# Patient Record
Sex: MALE | Race: WHITE | Employment: FULL TIME | URBAN - METROPOLITAN AREA
[De-identification: names, ages, dates, MRNs, and addresses within clinical notes are randomized per-mention and may not be internally consistent; named-entity substitution may affect disease eponyms.]

---

## 2023-01-21 ENCOUNTER — HOSPITAL ENCOUNTER (INPATIENT)
Age: 58
LOS: 2 days | Discharge: HOME | DRG: 149 | End: 2023-01-23
Attending: EMERGENCY MEDICINE | Admitting: INTERNAL MEDICINE
Payer: COMMERCIAL

## 2023-01-21 ENCOUNTER — APPOINTMENT (OUTPATIENT)
Dept: CT IMAGING | Age: 58
DRG: 149 | End: 2023-01-21
Payer: COMMERCIAL

## 2023-01-21 ENCOUNTER — APPOINTMENT (OUTPATIENT)
Dept: GENERAL RADIOLOGY | Age: 58
DRG: 149 | End: 2023-01-21
Payer: COMMERCIAL

## 2023-01-21 DIAGNOSIS — R11.2 NAUSEA AND VOMITING, UNSPECIFIED VOMITING TYPE: ICD-10-CM

## 2023-01-21 DIAGNOSIS — R42 ACUTE ONSET OF SEVERE VERTIGO: Primary | ICD-10-CM

## 2023-01-21 LAB
A/G RATIO: 1.3 (ref 1.1–2.2)
A/G RATIO: 1.3 (ref 1.1–2.2)
ALBUMIN SERPL-MCNC: 4.3 G/DL (ref 3.4–5)
ALBUMIN SERPL-MCNC: 4.5 G/DL (ref 3.4–5)
ALP BLD-CCNC: 103 U/L (ref 40–129)
ALP BLD-CCNC: 97 U/L (ref 40–129)
ALT SERPL-CCNC: 21 U/L (ref 10–40)
ALT SERPL-CCNC: 26 U/L (ref 10–40)
ANION GAP SERPL CALCULATED.3IONS-SCNC: 12 MMOL/L (ref 3–16)
ANION GAP SERPL CALCULATED.3IONS-SCNC: 15 MMOL/L (ref 3–16)
AST SERPL-CCNC: 16 U/L (ref 15–37)
AST SERPL-CCNC: 25 U/L (ref 15–37)
BACTERIA: NORMAL /HPF
BASOPHILS ABSOLUTE: 0.2 K/UL (ref 0–0.2)
BASOPHILS RELATIVE PERCENT: 1.5 %
BILIRUB SERPL-MCNC: 0.4 MG/DL (ref 0–1)
BILIRUB SERPL-MCNC: 0.5 MG/DL (ref 0–1)
BILIRUBIN URINE: NEGATIVE
BLOOD, URINE: NEGATIVE
BUN BLDV-MCNC: 22 MG/DL (ref 7–20)
BUN BLDV-MCNC: 22 MG/DL (ref 7–20)
CALCIUM SERPL-MCNC: 9 MG/DL (ref 8.3–10.6)
CALCIUM SERPL-MCNC: 9.6 MG/DL (ref 8.3–10.6)
CHLORIDE BLD-SCNC: 100 MMOL/L (ref 99–110)
CHLORIDE BLD-SCNC: 100 MMOL/L (ref 99–110)
CLARITY: CLEAR
CO2: 21 MMOL/L (ref 21–32)
CO2: 22 MMOL/L (ref 21–32)
COLOR: YELLOW
CREAT SERPL-MCNC: 1.4 MG/DL (ref 0.9–1.3)
CREAT SERPL-MCNC: 1.5 MG/DL (ref 0.9–1.3)
EOSINOPHILS ABSOLUTE: 0.7 K/UL (ref 0–0.6)
EOSINOPHILS RELATIVE PERCENT: 5.1 %
EPITHELIAL CELLS, UA: 0 /HPF (ref 0–5)
GFR SERPL CREATININE-BSD FRML MDRD: 54 ML/MIN/{1.73_M2}
GFR SERPL CREATININE-BSD FRML MDRD: 59 ML/MIN/{1.73_M2}
GLUCOSE BLD-MCNC: 163 MG/DL (ref 70–99)
GLUCOSE BLD-MCNC: 166 MG/DL (ref 70–99)
GLUCOSE URINE: NEGATIVE MG/DL
HCT VFR BLD CALC: 44 % (ref 40.5–52.5)
HEMOGLOBIN: 14.7 G/DL (ref 13.5–17.5)
HYALINE CASTS: 0 /LPF (ref 0–8)
INR BLD: 1.01 (ref 0.87–1.14)
KETONES, URINE: NEGATIVE MG/DL
LEUKOCYTE ESTERASE, URINE: NEGATIVE
LYMPHOCYTES ABSOLUTE: 4.3 K/UL (ref 1–5.1)
LYMPHOCYTES RELATIVE PERCENT: 33.6 %
MCH RBC QN AUTO: 29.3 PG (ref 26–34)
MCHC RBC AUTO-ENTMCNC: 33.5 G/DL (ref 31–36)
MCV RBC AUTO: 87.5 FL (ref 80–100)
MICROSCOPIC EXAMINATION: YES
MONOCYTES ABSOLUTE: 1.5 K/UL (ref 0–1.3)
MONOCYTES RELATIVE PERCENT: 11.9 %
NEUTROPHILS ABSOLUTE: 6.2 K/UL (ref 1.7–7.7)
NEUTROPHILS RELATIVE PERCENT: 47.9 %
NITRITE, URINE: NEGATIVE
PDW BLD-RTO: 13.9 % (ref 12.4–15.4)
PH UA: 7.5 (ref 5–8)
PLATELET # BLD: 373 K/UL (ref 135–450)
PMV BLD AUTO: 8.3 FL (ref 5–10.5)
POTASSIUM REFLEX MAGNESIUM: 3.7 MMOL/L (ref 3.5–5.1)
POTASSIUM REFLEX MAGNESIUM: 4.1 MMOL/L (ref 3.5–5.1)
PROTEIN UA: ABNORMAL MG/DL
PROTHROMBIN TIME: 13.2 SEC (ref 11.7–14.5)
RBC # BLD: 5.03 M/UL (ref 4.2–5.9)
RBC UA: 0 /HPF (ref 0–4)
SODIUM BLD-SCNC: 134 MMOL/L (ref 136–145)
SODIUM BLD-SCNC: 136 MMOL/L (ref 136–145)
SPECIFIC GRAVITY UA: 1.02 (ref 1–1.03)
TOTAL PROTEIN: 7.7 G/DL (ref 6.4–8.2)
TOTAL PROTEIN: 8.1 G/DL (ref 6.4–8.2)
TROPONIN: <0.01 NG/ML
URINE REFLEX TO CULTURE: ABNORMAL
URINE TYPE: ABNORMAL
UROBILINOGEN, URINE: 1 E.U./DL
WBC # BLD: 12.9 K/UL (ref 4–11)
WBC UA: 0 /HPF (ref 0–5)

## 2023-01-21 PROCEDURE — 71045 X-RAY EXAM CHEST 1 VIEW: CPT

## 2023-01-21 PROCEDURE — 70450 CT HEAD/BRAIN W/O DYE: CPT

## 2023-01-21 PROCEDURE — 6370000000 HC RX 637 (ALT 250 FOR IP): Performed by: INTERNAL MEDICINE

## 2023-01-21 PROCEDURE — 96374 THER/PROPH/DIAG INJ IV PUSH: CPT

## 2023-01-21 PROCEDURE — 2580000003 HC RX 258: Performed by: EMERGENCY MEDICINE

## 2023-01-21 PROCEDURE — 70498 CT ANGIOGRAPHY NECK: CPT

## 2023-01-21 PROCEDURE — 96361 HYDRATE IV INFUSION ADD-ON: CPT

## 2023-01-21 PROCEDURE — 84484 ASSAY OF TROPONIN QUANT: CPT

## 2023-01-21 PROCEDURE — 99285 EMERGENCY DEPT VISIT HI MDM: CPT

## 2023-01-21 PROCEDURE — 81001 URINALYSIS AUTO W/SCOPE: CPT

## 2023-01-21 PROCEDURE — 6360000004 HC RX CONTRAST MEDICATION: Performed by: EMERGENCY MEDICINE

## 2023-01-21 PROCEDURE — 85025 COMPLETE CBC W/AUTO DIFF WBC: CPT

## 2023-01-21 PROCEDURE — 1200000000 HC SEMI PRIVATE

## 2023-01-21 PROCEDURE — 85610 PROTHROMBIN TIME: CPT

## 2023-01-21 PROCEDURE — 2060000000 HC ICU INTERMEDIATE R&B

## 2023-01-21 PROCEDURE — 6360000002 HC RX W HCPCS: Performed by: EMERGENCY MEDICINE

## 2023-01-21 PROCEDURE — 80053 COMPREHEN METABOLIC PANEL: CPT

## 2023-01-21 PROCEDURE — 36415 COLL VENOUS BLD VENIPUNCTURE: CPT

## 2023-01-21 PROCEDURE — 6360000002 HC RX W HCPCS: Performed by: INTERNAL MEDICINE

## 2023-01-21 PROCEDURE — 94760 N-INVAS EAR/PLS OXIMETRY 1: CPT

## 2023-01-21 PROCEDURE — 93005 ELECTROCARDIOGRAM TRACING: CPT | Performed by: EMERGENCY MEDICINE

## 2023-01-21 PROCEDURE — 2580000003 HC RX 258: Performed by: INTERNAL MEDICINE

## 2023-01-21 PROCEDURE — 6370000000 HC RX 637 (ALT 250 FOR IP): Performed by: EMERGENCY MEDICINE

## 2023-01-21 RX ORDER — SODIUM CHLORIDE, SODIUM LACTATE, POTASSIUM CHLORIDE, CALCIUM CHLORIDE 600; 310; 30; 20 MG/100ML; MG/100ML; MG/100ML; MG/100ML
INJECTION, SOLUTION INTRAVENOUS CONTINUOUS
Status: DISCONTINUED | OUTPATIENT
Start: 2023-01-21 | End: 2023-01-23

## 2023-01-21 RX ORDER — DIAZEPAM 5 MG/1
5 TABLET ORAL ONCE
Status: COMPLETED | OUTPATIENT
Start: 2023-01-21 | End: 2023-01-21

## 2023-01-21 RX ORDER — ASPIRIN 300 MG/1
300 SUPPOSITORY RECTAL DAILY
Status: DISCONTINUED | OUTPATIENT
Start: 2023-01-21 | End: 2023-01-23 | Stop reason: HOSPADM

## 2023-01-21 RX ORDER — ALLOPURINOL 100 MG/1
100 TABLET ORAL DAILY
COMMUNITY

## 2023-01-21 RX ORDER — ASPIRIN 81 MG/1
81 TABLET ORAL DAILY
Status: DISCONTINUED | OUTPATIENT
Start: 2023-01-21 | End: 2023-01-23 | Stop reason: HOSPADM

## 2023-01-21 RX ORDER — MECLIZINE HCL 12.5 MG/1
25 TABLET ORAL 3 TIMES DAILY PRN
Status: DISCONTINUED | OUTPATIENT
Start: 2023-01-21 | End: 2023-01-23 | Stop reason: HOSPADM

## 2023-01-21 RX ORDER — AMLODIPINE BESYLATE 10 MG/1
10 TABLET ORAL DAILY
COMMUNITY

## 2023-01-21 RX ORDER — AMLODIPINE BESYLATE 5 MG/1
10 TABLET ORAL DAILY
Status: DISCONTINUED | OUTPATIENT
Start: 2023-01-22 | End: 2023-01-23 | Stop reason: HOSPADM

## 2023-01-21 RX ORDER — ENOXAPARIN SODIUM 100 MG/ML
40 INJECTION SUBCUTANEOUS DAILY
Status: DISCONTINUED | OUTPATIENT
Start: 2023-01-21 | End: 2023-01-23 | Stop reason: HOSPADM

## 2023-01-21 RX ORDER — ASPIRIN 81 MG/1
81 TABLET ORAL ONCE
Status: COMPLETED | OUTPATIENT
Start: 2023-01-21 | End: 2023-01-21

## 2023-01-21 RX ORDER — TELMISARTAN 80 MG/1
80 TABLET ORAL DAILY
COMMUNITY
Start: 2022-01-13

## 2023-01-21 RX ORDER — POLYETHYLENE GLYCOL 3350 17 G/17G
17 POWDER, FOR SOLUTION ORAL DAILY PRN
Status: DISCONTINUED | OUTPATIENT
Start: 2023-01-21 | End: 2023-01-23 | Stop reason: HOSPADM

## 2023-01-21 RX ORDER — ONDANSETRON 2 MG/ML
4 INJECTION INTRAMUSCULAR; INTRAVENOUS ONCE
Status: COMPLETED | OUTPATIENT
Start: 2023-01-21 | End: 2023-01-21

## 2023-01-21 RX ORDER — 0.9 % SODIUM CHLORIDE 0.9 %
1000 INTRAVENOUS SOLUTION INTRAVENOUS ONCE
Status: COMPLETED | OUTPATIENT
Start: 2023-01-21 | End: 2023-01-21

## 2023-01-21 RX ORDER — MECLIZINE HCL 12.5 MG/1
25 TABLET ORAL ONCE
Status: COMPLETED | OUTPATIENT
Start: 2023-01-21 | End: 2023-01-21

## 2023-01-21 RX ORDER — ONDANSETRON 2 MG/ML
4 INJECTION INTRAMUSCULAR; INTRAVENOUS EVERY 6 HOURS PRN
Status: DISCONTINUED | OUTPATIENT
Start: 2023-01-21 | End: 2023-01-23 | Stop reason: HOSPADM

## 2023-01-21 RX ORDER — ATORVASTATIN CALCIUM 80 MG/1
80 TABLET, FILM COATED ORAL NIGHTLY
Status: DISCONTINUED | OUTPATIENT
Start: 2023-01-21 | End: 2023-01-23 | Stop reason: HOSPADM

## 2023-01-21 RX ORDER — ONDANSETRON 4 MG/1
4 TABLET, ORALLY DISINTEGRATING ORAL EVERY 8 HOURS PRN
Status: DISCONTINUED | OUTPATIENT
Start: 2023-01-21 | End: 2023-01-23 | Stop reason: HOSPADM

## 2023-01-21 RX ADMIN — ATORVASTATIN CALCIUM 80 MG: 80 TABLET, FILM COATED ORAL at 21:39

## 2023-01-21 RX ADMIN — ENOXAPARIN SODIUM 40 MG: 100 INJECTION SUBCUTANEOUS at 16:54

## 2023-01-21 RX ADMIN — MECLIZINE 25 MG: 12.5 TABLET ORAL at 11:46

## 2023-01-21 RX ADMIN — IOPAMIDOL 75 ML: 755 INJECTION, SOLUTION INTRAVENOUS at 12:00

## 2023-01-21 RX ADMIN — ONDANSETRON 4 MG: 2 INJECTION INTRAMUSCULAR; INTRAVENOUS at 11:00

## 2023-01-21 RX ADMIN — ONDANSETRON 4 MG: 2 INJECTION INTRAMUSCULAR; INTRAVENOUS at 16:46

## 2023-01-21 RX ADMIN — ASPIRIN 81 MG: 81 TABLET, COATED ORAL at 21:39

## 2023-01-21 RX ADMIN — SODIUM CHLORIDE 1000 ML: 9 INJECTION, SOLUTION INTRAVENOUS at 11:47

## 2023-01-21 RX ADMIN — SODIUM CHLORIDE, POTASSIUM CHLORIDE, SODIUM LACTATE AND CALCIUM CHLORIDE: 600; 310; 30; 20 INJECTION, SOLUTION INTRAVENOUS at 16:54

## 2023-01-21 RX ADMIN — DIAZEPAM 5 MG: 5 TABLET ORAL at 11:46

## 2023-01-21 ASSESSMENT — PAIN - FUNCTIONAL ASSESSMENT: PAIN_FUNCTIONAL_ASSESSMENT: NONE - DENIES PAIN

## 2023-01-21 NOTE — PLAN OF CARE
Pt and wife visiting from [place]. Pt was driving and suddenly became dizzy and felt like \"the room was spinning\" Pt pulled over and family brought in to the ED. Pt up to unit via bed stretcher. Pt a/o x4. SBA x1 d/t dizziness. Continent of b/b. Assisted to bathroom once settled in room. Urinal at bedside-need urine sample. Held oral meds d/t n/v. IV Zofran @ 1646. NIHSS-0. Orthostatic bp/p done. NPO. MRI check list completed and faxed. IV fluids LR @ 100 ml/hr continuous. Emesis bag at bedside. Pt vomit is clear. Family at bedside. Call light in reach.     Problem: Discharge Planning  Goal: Discharge to home or other facility with appropriate resources  Outcome: Progressing  Flowsheets (Taken 1/21/2023 0760)  Discharge to home or other facility with appropriate resources:   Identify barriers to discharge with patient and caregiver   Identify discharge learning needs (meds, wound care, etc)   Refer to discharge planning if patient needs post-hospital services based on physician order or complex needs related to functional status, cognitive ability or social support system   Arrange for needed discharge resources and transportation as appropriate     Problem: Respiratory - Adult  Goal: Achieves optimal ventilation and oxygenation  Outcome: Progressing     Problem: Cardiovascular - Adult  Goal: Maintains optimal cardiac output and hemodynamic stability  Outcome: Progressing     Problem: Skin/Tissue Integrity - Adult  Goal: Skin integrity remains intact  Outcome: Progressing     Problem: Skin/Tissue Integrity - Adult  Goal: Oral mucous membranes remain intact  Outcome: Progressing     Problem: Gastrointestinal - Adult  Goal: Minimal or absence of nausea and vomiting  Outcome: Progressing     Problem: Gastrointestinal - Adult  Goal: Maintains or returns to baseline bowel function  Outcome: Progressing     Problem: Gastrointestinal - Adult  Goal: Maintains adequate nutritional intake  Outcome: Progressing     Problem: Genitourinary - Adult  Goal: Absence of urinary retention  Outcome: Progressing     Problem: Infection - Adult  Goal: Absence of infection at discharge  Outcome: Progressing     Problem: Infection - Adult  Goal: Absence of infection during hospitalization  Outcome: Progressing     Problem: Infection - Adult  Goal: Absence of fever/infection during anticipated neutropenic period  Outcome: Progressing     Problem: Metabolic/Fluid and Electrolytes - Adult  Goal: Electrolytes maintained within normal limits  Outcome: Progressing     Problem: Metabolic/Fluid and Electrolytes - Adult  Goal: Hemodynamic stability and optimal renal function maintained  Outcome: Progressing     Problem: Hematologic - Adult  Goal: Maintains hematologic stability  Outcome: Progressing

## 2023-01-21 NOTE — ED PROVIDER NOTES
Emergency Department provider note    CHIEF COMPLAINT  Dizziness (Was driving and c/o dizziness, with feeling of room spinning with n/v. Denies h/a, and numbness and tingling)      HISTORY OF PRESENT ILLNESS  Emiliano Good is a 65 y.o. male  who presents to the ED after acute spinning and nausea started today. Patient was just driving when this happened all of a sudden felt like his vision was double like everything was spinning, he had no headache no numbness, tingling or weakness in the arms or legs. No facial droop or difficulty with speech was noted. . No other complaints, modifying factors or associated symptoms. I have reviewed the following from the nursing documentation. History reviewed. No pertinent past medical history. History reviewed. No pertinent surgical history. History reviewed. No pertinent family history.   Social History     Socioeconomic History    Marital status:      Spouse name: Not on file    Number of children: Not on file    Years of education: Not on file    Highest education level: Not on file   Occupational History    Not on file   Tobacco Use    Smoking status: Never    Smokeless tobacco: Never   Substance and Sexual Activity    Alcohol use: Yes     Comment: socially    Drug use: Never    Sexual activity: Not on file   Other Topics Concern    Not on file   Social History Narrative    Not on file     Social Determinants of Health     Financial Resource Strain: Not on file   Food Insecurity: Not on file   Transportation Needs: Not on file   Physical Activity: Not on file   Stress: Not on file   Social Connections: Not on file   Intimate Partner Violence: Not on file   Housing Stability: Not on file     Current Facility-Administered Medications   Medication Dose Route Frequency Provider Last Rate Last Admin    0.9 % sodium chloride bolus  1,000 mL IntraVENous Once Krissy Dumont MD        meclizine (ANTIVERT) tablet 25 mg  25 mg Oral Once Krissy Dumont MD        diazePAM (VALIUM) tablet 5 mg  5 mg Oral Once Dianna Monsalve MD         Current Outpatient Medications   Medication Sig Dispense Refill    amLODIPine (NORVASC) 10 MG tablet Take 10 mg by mouth daily       No Known Allergies    REVIEW OF SYSTEMS  10 systems reviewed, pertinent positives per HPI otherwise noted to be negative. PHYSICAL EXAM  BP (!) 171/97   Pulse 68   Temp 97.5 °F (36.4 °C) (Oral)   Resp 20   Ht 5' 6\" (1.676 m)   Wt 190 lb (86.2 kg)   SpO2 97%   BMI 30.67 kg/m²   GENERAL APPEARANCE: Awake and alert. Cooperative. Moderate distress actively vomiting  HEAD: Normocephalic. Atraumatic. EYES: Pupils are equal round and reactive, however he has moderate lateral nystagmus, no vertical or rotary. ENT: Mucous membranes are dry. NECK: Supple. No JVD. HEART: RRR. No murmurs. LUNGS: Respirations unlabored. CTAB. Good air exchange. Speaking comfortably in full sentences. ABDOMEN: Soft. Non-distended. Non-tender. No masses. No organomegaly. No guarding or rebound. EXTREMITIES: No peripheral edema. Moves all extremities equally. All extremities neurovascularly intact. SKIN: Warm and dry. No acute rashes. NEUROLOGICAL: Alert and oriented. Please see NIH Stroke Scale below. PSYCHIATRIC: Normal mood and affect. NIH Stroke Scale     Interval: Baseline  Time: Upon arrival  Person Administering Scale: Maria C Leary MD   Administer stroke scale items in the order listed. Record performance in each category after each subscale exam. Do not go back and change scores. Follow directions provided for each exam technique. Scores should reflect what the patient does, not what the clinician thinks the patient can do. The clinician should record answers while administering the exam and work quickly. Except where indicated, the patient should not be coached (i.e., repeated requests to patient to make a special effort).      1a  Level of consciousness: 0=alert; keenly responsive 1b. LOC questions:  0=Performs both tasks correctly   1c. LOC commands: 0=Performs both tasks correctly   2. Best Gaze: 0=normal   3. Visual: 0=No visual loss   4. Facial Palsy: 0=Normal symmetric movement   5a. Motor left arm: 0=No drift, limb holds 90 (or 45) degrees for full 10 seconds   5b. Motor right arm: 0=No drift, limb holds 90 (or 45) degrees for full 10 seconds   6a. motor left le=No drift, limb holds 90 (or 45) degrees for full 10 seconds   6b  Motor right le=No drift, limb holds 90 (or 45) degrees for full 10 seconds   7. Limb Ataxia: 0=Absent   8. Sensory: 0=Normal; no sensory loss   9. Best Language:  0=No aphasia, normal   10. Dysarthria: 0=Normal   11. Extinction and Inattention: 0=No abnormality   12. Distal motor function: 0=Normal    Total:  0     Modified Simpson Score - Assessing Disability From Stroke    Score: 1 - No significant disability despite symptoms; able to carry out all usual duties and activities    LABS  I have reviewed all labs for this visit.    Results for orders placed or performed during the hospital encounter of 23   Comprehensive Metabolic Panel w/ Reflex to MG   Result Value Ref Range    Sodium 136 136 - 145 mmol/L    Potassium reflex Magnesium 3.7 3.5 - 5.1 mmol/L    Chloride 100 99 - 110 mmol/L    CO2 21 21 - 32 mmol/L    Anion Gap 15 3 - 16    Glucose 163 (H) 70 - 99 mg/dL    BUN 22 (H) 7 - 20 mg/dL    Creatinine 1.4 (H) 0.9 - 1.3 mg/dL    Est, Glom Filt Rate 59 (A) >60    Calcium 9.6 8.3 - 10.6 mg/dL    Total Protein 8.1 6.4 - 8.2 g/dL    Albumin 4.5 3.4 - 5.0 g/dL    Albumin/Globulin Ratio 1.3 1.1 - 2.2    Total Bilirubin 0.5 0.0 - 1.0 mg/dL    Alkaline Phosphatase 103 40 - 129 U/L    ALT 26 10 - 40 U/L    AST 25 15 - 37 U/L   CBC with Auto Differential   Result Value Ref Range    WBC 12.9 (H) 4.0 - 11.0 K/uL    RBC 5.03 4.20 - 5.90 M/uL    Hemoglobin 14.7 13.5 - 17.5 g/dL    Hematocrit 44.0 40.5 - 52.5 %    MCV 87.5 80.0 - 100.0 fL    MCH 29.3 26.0 - 34.0 pg    MCHC 33.5 31.0 - 36.0 g/dL    RDW 13.9 12.4 - 15.4 %    Platelets 470 546 - 697 K/uL    MPV 8.3 5.0 - 10.5 fL    Neutrophils % 47.9 %    Lymphocytes % 33.6 %    Monocytes % 11.9 %    Eosinophils % 5.1 %    Basophils % 1.5 %    Neutrophils Absolute 6.2 1.7 - 7.7 K/uL    Lymphocytes Absolute 4.3 1.0 - 5.1 K/uL    Monocytes Absolute 1.5 (H) 0.0 - 1.3 K/uL    Eosinophils Absolute 0.7 (H) 0.0 - 0.6 K/uL    Basophils Absolute 0.2 0.0 - 0.2 K/uL       EKG Interpretation    Interpreted by emergency department physician    Rhythm: normal sinus   Rate: normal  Axis: normal  Ectopy: none  Conduction: normal  ST Segments: no acute change  T Waves: no acute change  Q Waves: none    Clinical Impression: no acute changes        RADIOLOGY    XR CHEST PORTABLE   Final Result   No acute cardiopulmonary disease. 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 Partial opacification of the   distal V2 segment is likely due to collaterals. In addition there is   multifocal occlusion of the intracranial V4 segment of the left vertebral   artery      No significant cervical carotid stenosis      No intracranial aneurysm      The results of the examination were discussed with Dr. Radha Olvera on 01/21/2023 at   1:06 p.m.         CT HEAD WO CONTRAST   Final Result   No acute intracranial process. Findings were discussed with Tali Welch by the radiology call   center at 12:13 on 1/21/2023.              I discussed the CT imaging results with the radiologist, I also discussed the CT imaging with the stroke physician who feels the vertebral artery has narrowing but not occlusion, however he recommends MRI of the brain and symptom support, does not recommend thrombolytics      TIMES:  Last Known Well: 10:30  Arrival to ED: 10:45    Reason for Delays:  [] Social/Judaism  [] Initial refusal of testing or treatment  [] Care team unable to identify eligibility  [] Hypertension requiring aggressive control with IV medications  [] Further diagnostic evaluation to confirm stroke for patients with hypoglycemia (blood glucose <50), seizures, or major metabolic disorders  [] Management of concomitant emergent/acute conditions such as cardiopulmonary arrest, respiratory failure (requiring intubation)  [] Investigational or experimental protocol for thrombolysis    ED COURSE/MDM  Patient seen and evaluated. Old records reviewed. Labs and imaging reviewed and results discussed. Patient was seen for acute vertigo with vomiting, based on history and physical patient was given symptom support for acute vertigo vertigo as this is highly suggestive of peripheral cause based on the neurologic exam, with continued symptoms, I spoke with the stroke physician and completed a stroke work-up, was able to ambulate the patient and he was able to do so however he is still actively vomiting so we will admit for symptom support but continue to get imaging for delineation of the cause of symptoms. I spoke with the stroke physician we thoroughly discussed the history, physical exam, laboratory and imaging studies, as well as, emergency department course. Based upon that discussion, we've decided to admit Emiliano Good for further observation and evaluation of Enid Bourgeois CVA-like symptoms. As I have deemed necessary from their history, physical and studies, I have considered and evaluated Emiliano Good for the following diagnoses:  DIABETES, INTRACRANIAL HEMORRHAGE, MENINGITIS, SUBARACHNOID HEMORRHAGE, SUBDURAL HEMATOMA, & STROKE. CLINICAL IMPRESSION  1. Acute onset of severe vertigo    2. Nausea and vomiting, unspecified vomiting type        Blood pressure (!) 171/97, pulse 68, temperature 97.5 °F (36.4 °C), temperature source Oral, resp. rate 20, height 5' 6\" (1.676 m), weight 190 lb (86.2 kg), SpO2 97 %. DISPOSITION  Emiliano Good was admitted in stable condition.      CRITICAL CARE TIME:  Total critical care time today provided was 30 minutes. This excludes seperately billable procedures and family discussion time. Provided for evaluating for stroke, treating active vomiting and consultation with stroke experts with concern for potential decompensation. Carmina Lynn MD    Comment: Please note this report has been produced using speech recognition software and may contain errors related to that system including errors in grammar, punctuation, and spelling, as well as words and phrases that may be inappropriate. If there are any questions or concerns please feel free to contact the dictating provider for clarification.        Kenny Dash MD  01/21/23 1072

## 2023-01-21 NOTE — ED NOTES
Report given to [facility]. No further questions at this time. Pt transported to  via ER stretcher by [facility]. No sign of distress, VSS.       Mak Handler, ERIKA  01/21/23 6956

## 2023-01-21 NOTE — H&P
Hospital Medicine History & Physical      PCP: No primary care provider on file. Date of Admission: 1/21/2023    Date of Service: Pt seen/examined on 1/21 and Admitted to Inpatient with expected LOS greater than two midnights due to medical therapy. Chief Complaint:  Dizziness      History Of Present Illness:     65 y.o. male with PMH of htn who presents with dizziness. Was driving this morning when he suddenly had abrupt dizziness. Associated with double vision and sensation that room was spinning. Later with nausea and vomiting. Denies dysphasia, dysarthria, HA, focal motor or sensory deficits. Otherwise denies acute complaints and has been feeling at normal health. Past Medical History:      History reviewed. No pertinent past medical history. Past Surgical History:      History reviewed. No pertinent surgical history. Medications Prior to Admission:      Prior to Admission medications    Medication Sig Start Date End Date Taking? Authorizing Provider   amLODIPine (NORVASC) 10 MG tablet Take 10 mg by mouth daily   Yes Historical Provider, MD       Allergies:  Patient has no known allergies. Social History:      TOBACCO:   reports that he has never smoked. He has never used smokeless tobacco.  ETOH:   reports current alcohol use. Family History:      History reviewed. No pertinent family history. REVIEW OF SYSTEMS:   Pertinent positives as noted in the HPI. All other systems reviewed and negative. PHYSICAL EXAM:    BP (!) 149/97   Pulse 78   Temp 97.5 °F (36.4 °C) (Oral)   Resp 10   Ht 5' 6\" (1.676 m)   Wt 190 lb (86.2 kg)   SpO2 100%   BMI 30.67 kg/m²     Gen/overall appearance: Not in acute distress. Alert. Head: Normocephalic, atraumatic  Eyes: EOMI, no scleral icterus  CVS: regular rate and rhythm, Normal S1S2  Pulm: Clear to auscultation bilaterally. No crackles/wheezes  Extremities: No edema.  No erythema or warmth  Neuro: No gross focal deficits noted  Skin: Warm, dry    Labs:     Recent Labs     01/21/23  1101   WBC 12.9*   HGB 14.7   HCT 44.0        Recent Labs     01/21/23  1101 01/21/23  1210    134*   K 3.7 4.1    100   CO2 21 22   BUN 22* 22*   CREATININE 1.4* 1.5*   CALCIUM 9.6 9.0     Recent Labs     01/21/23  1101 01/21/23  1210   AST 25 16   ALT 26 21   BILITOT 0.5 0.4   ALKPHOS 103 97     Recent Labs     01/21/23  1210   INR 1.01     Recent Labs     01/21/23  1101   TROPONINI <0.01       Urinalysis:    No results found for: NITRU, WBCUA, BACTERIA, RBCUA, BLOODU, SPECGRAV, GLUCOSEU      ASSESSMENT:    Dizziness. Suspect vertigo. Rule out posterior circulation CVA  - CT head without hemorrhage  - CT-A remarkable for occluded L vertebral artery with collaterals  - MRI brain  - ECHO  - ASA and statin  - tele  - neuro checks  - meclizine and zofran prn  - consider neuro eval    HANK. Suspect prerenal azotemia  - IVF hydration  - trend Cr  - monitor and replace lytes  - ins/outs  - avoid nephrotoxins    Leukocytosis. Suspect reactive  - CXR non-acute  - UA  - trend CBC    DVT Prophylaxis: lovenox  Diet: Diet NPO  Code Status: No Order         Umesh Coon MD    Thank you No primary care provider on file. for the opportunity to be involved in this patient's care.

## 2023-01-22 LAB
ANION GAP SERPL CALCULATED.3IONS-SCNC: 11 MMOL/L (ref 3–16)
BUN BLDV-MCNC: 17 MG/DL (ref 7–20)
CALCIUM SERPL-MCNC: 9 MG/DL (ref 8.3–10.6)
CHLORIDE BLD-SCNC: 105 MMOL/L (ref 99–110)
CHOLESTEROL, TOTAL: 232 MG/DL (ref 0–199)
CO2: 23 MMOL/L (ref 21–32)
CREAT SERPL-MCNC: 1.3 MG/DL (ref 0.9–1.3)
EKG ATRIAL RATE: 79 BPM
EKG DIAGNOSIS: NORMAL
EKG P AXIS: 35 DEGREES
EKG P-R INTERVAL: 160 MS
EKG Q-T INTERVAL: 382 MS
EKG QRS DURATION: 72 MS
EKG QTC CALCULATION (BAZETT): 438 MS
EKG R AXIS: 0 DEGREES
EKG T AXIS: 51 DEGREES
EKG VENTRICULAR RATE: 79 BPM
GFR SERPL CREATININE-BSD FRML MDRD: >60 ML/MIN/{1.73_M2}
GLUCOSE BLD-MCNC: 101 MG/DL (ref 70–99)
HCT VFR BLD CALC: 37.3 % (ref 40.5–52.5)
HDLC SERPL-MCNC: 30 MG/DL (ref 40–60)
HEMOGLOBIN: 12.8 G/DL (ref 13.5–17.5)
LDL CHOLESTEROL CALCULATED: 159 MG/DL
MCH RBC QN AUTO: 29.5 PG (ref 26–34)
MCHC RBC AUTO-ENTMCNC: 34.2 G/DL (ref 31–36)
MCV RBC AUTO: 86.2 FL (ref 80–100)
PDW BLD-RTO: 13.7 % (ref 12.4–15.4)
PLATELET # BLD: 311 K/UL (ref 135–450)
PMV BLD AUTO: 8.3 FL (ref 5–10.5)
POTASSIUM REFLEX MAGNESIUM: 3.9 MMOL/L (ref 3.5–5.1)
RBC # BLD: 4.33 M/UL (ref 4.2–5.9)
SODIUM BLD-SCNC: 139 MMOL/L (ref 136–145)
TRIGL SERPL-MCNC: 217 MG/DL (ref 0–150)
VLDLC SERPL CALC-MCNC: 43 MG/DL
WBC # BLD: 12.8 K/UL (ref 4–11)

## 2023-01-22 PROCEDURE — 1200000000 HC SEMI PRIVATE

## 2023-01-22 PROCEDURE — 2580000003 HC RX 258: Performed by: INTERNAL MEDICINE

## 2023-01-22 PROCEDURE — 92610 EVALUATE SWALLOWING FUNCTION: CPT

## 2023-01-22 PROCEDURE — 2060000000 HC ICU INTERMEDIATE R&B

## 2023-01-22 PROCEDURE — 6360000002 HC RX W HCPCS: Performed by: INTERNAL MEDICINE

## 2023-01-22 PROCEDURE — 85027 COMPLETE CBC AUTOMATED: CPT

## 2023-01-22 PROCEDURE — 36415 COLL VENOUS BLD VENIPUNCTURE: CPT

## 2023-01-22 PROCEDURE — 6370000000 HC RX 637 (ALT 250 FOR IP): Performed by: INTERNAL MEDICINE

## 2023-01-22 PROCEDURE — 80061 LIPID PANEL: CPT

## 2023-01-22 PROCEDURE — 83036 HEMOGLOBIN GLYCOSYLATED A1C: CPT

## 2023-01-22 PROCEDURE — 80048 BASIC METABOLIC PNL TOTAL CA: CPT

## 2023-01-22 PROCEDURE — 93010 ELECTROCARDIOGRAM REPORT: CPT | Performed by: INTERNAL MEDICINE

## 2023-01-22 RX ADMIN — AMLODIPINE BESYLATE 10 MG: 5 TABLET ORAL at 08:35

## 2023-01-22 RX ADMIN — SODIUM CHLORIDE, POTASSIUM CHLORIDE, SODIUM LACTATE AND CALCIUM CHLORIDE: 600; 310; 30; 20 INJECTION, SOLUTION INTRAVENOUS at 22:24

## 2023-01-22 RX ADMIN — SODIUM CHLORIDE, POTASSIUM CHLORIDE, SODIUM LACTATE AND CALCIUM CHLORIDE: 600; 310; 30; 20 INJECTION, SOLUTION INTRAVENOUS at 12:21

## 2023-01-22 RX ADMIN — ENOXAPARIN SODIUM 40 MG: 100 INJECTION SUBCUTANEOUS at 08:35

## 2023-01-22 RX ADMIN — ONDANSETRON 4 MG: 2 INJECTION INTRAMUSCULAR; INTRAVENOUS at 13:40

## 2023-01-22 RX ADMIN — ASPIRIN 81 MG: 81 TABLET, COATED ORAL at 08:38

## 2023-01-22 RX ADMIN — ONDANSETRON 4 MG: 2 INJECTION INTRAMUSCULAR; INTRAVENOUS at 21:40

## 2023-01-22 RX ADMIN — SODIUM CHLORIDE, POTASSIUM CHLORIDE, SODIUM LACTATE AND CALCIUM CHLORIDE: 600; 310; 30; 20 INJECTION, SOLUTION INTRAVENOUS at 02:03

## 2023-01-22 RX ADMIN — MECLIZINE 25 MG: 12.5 TABLET ORAL at 21:40

## 2023-01-22 RX ADMIN — MECLIZINE 25 MG: 12.5 TABLET ORAL at 13:40

## 2023-01-22 RX ADMIN — ATORVASTATIN CALCIUM 80 MG: 80 TABLET, FILM COATED ORAL at 21:40

## 2023-01-22 RX ADMIN — MECLIZINE 25 MG: 12.5 TABLET ORAL at 07:28

## 2023-01-22 RX ADMIN — ONDANSETRON 4 MG: 2 INJECTION INTRAMUSCULAR; INTRAVENOUS at 08:35

## 2023-01-22 NOTE — PROGRESS NOTES
Facility/Department: [address]  Initial Assessment  DYSPHAGIA BEDSIDE SWALLOW EVALUATION     Patient: Mark Nicole   : 1965   MRN: [id number]      Evaluation Date: 2023   Admitting Diagnosis: Dizziness [R42]  Acute onset of severe vertigo [R42]  Nausea and vomiting, unspecified vomiting type [R11.2]  Pain: Denies pain at this time, reports dizziness has resumed this morning. RN aware. Per patient, already received medication. H&P:    Roberta Madrigal is a 65 y.o. male  who presents to the ED after acute spinning and nausea started today. Patient was just driving when this happened all of a sudden felt like his vision was double like everything was spinning, he had no headache no numbness, tingling or weakness in the arms or legs. No facial droop or difficulty with speech was noted. . No other complaints, modifying factors or associated symptoms. \"      Imaging:  Chest X-ray:     Impression   No acute cardiopulmonary disease. Head CT:     Impression   No acute intracranial process. History/Prior Level of Function:   Living Status: Home with wife  Prior Dysphagia History: No history of dysphagia  Reason for referral: SLP evaluation orders received due to CVA protocol  and possible CVA symptoms. Dysphagia Impressions/Diagnosis: Oropharyngeal Dysphagia   RN cleared SLP for entry. Patient alert in bed and agreeable to evaluation. On RA. Oriented to self, month, year, place, and situation. Able to participate in conversation and follow all commands independently. OME reveals intact dentition and functional oral motor structures & abilities. Per patient, no prior history of dysphagia and patient denies any difficulty swallowing at this time. Accepting of PO trials to assess diet tolerance.     Thin via straw revealed timely initiation of swallow, sufficient hyolaryngeal elevation via manual palpation, and no overt s/s of aspiration (including on consecutive swallows). Regular solids resulted in functional mastication, cohesive bolus formation, and adequate oral clearance. No overt s/s of aspiration. Overall, patient exhibits oropharyngeal function WNL. Recommend continuation of Regular solids, Thin liquids. No further follow-up is indicated at this time. SLP provided education and patient voiced understanding. Recommended Diet and Intervention 1/22/2023:  Diet Solids Recommendation:  Regular texture diet  Liquid Consistency Recommendation: Thin liquids  Recommended form of Meds: Meds whole with water       Compensatory Swallowing Strategies:  N/A     SHORT TERM DYSPHAGIA GOALS/PLAN OF CARE: No further follow-up indicated   N/A    Dysphagia Therapeutic Intervention:  N/A    Discharge Recommendations: No further follow-up appears indicated at this time.      Patient Positioning: Upright in bed     Current Diet Level (prior to evaluation): Regular texture diet  Thin liquids      Respiratory Status:   [x]Room Air   []O2 via nasal cannula   []Other:    Dentition:  [x]Adequate  []Dentures   []Missing Many Teeth  []Edentulous  []Other:    Baseline Vocal Quality:  [x]Normal  []Dysphonic   []Aphonic   []Hoarse  []Wet  []Weak  []Other:    Volitional Cough:  Elicited: Strong     Volitional Swallow:   []Absent   []Delayed     [x]Adequate     []Required use of drink     Oral Mechanism Exam:  [x]WFL []Mild   [] Moderate  []Severe  []To be assessed  Impaired:   []Left side      []Right side    []Labial ROM/Coordination    []Labial Symmetry   []Lingual ROM/Coordination   []Lingual Symmetry  []Gag  []Other:     Oral Phase: [x]WFL []Mild   [] Moderate  []Severe  []To be assessed   []Impaired/Prolonged Mastication:   []Oral Holding:   []Spillage Left:   []Spillage Right:  []Pocketing Left:   []Pocketing Right:   []Decreased Anterior to Posterior Transit:   []Suspected Premature Bolus Loss:   []Lingual/Palatal Residue:   []Other:     Pharyngeal Phase: [x]WFL []Mild   [] Moderate  []Severe  []To be assessed   []Delayed Swallow:   []Suspected Pharyngeal Pooling:   []Decreased Laryngeal Elevation:   []Absent Swallow:  []Wet Vocal Quality:   []Throat Clearing-Immediate:   []Throat Clearing-Delayed:   []Cough-Immediate:   []Cough-Delayed:  []Change in Vital Signs:  []Suspected Delayed Pharyngeal Clearing:  []Other:     Eating Assistance:  [x]Independent  []Setup or clean-up assistance   [] Supervision or touching assistance   [] Partial or moderate assistance   [] Substantial or maximal assistance  [] Dependent     EDUCATION:   Provided education regarding role of SLP, results of assessment, recommendations and general speech pathology plan of care. [x] Pt verbalized understanding and agreement   [] Pt requires ongoing learning   [] No evidence of comprehension     If patient discharges prior to next visit, this note will serve as discharge.      Treatment time:  Timed Code Treatment Minutes: 0 minutes  Total Treatment time: 11 minutes    Electronically signed by:   Alejandro Sage M.A., [address]  Speech-Language Pathologist

## 2023-01-22 NOTE — PLAN OF CARE
NIH remains zero; pt denies dizziness, steady on feet, alert & orientated x4; pt understands care and treatment plan. Wife at bedside through night. VSS; standard safety precautions in place.       Problem: Discharge Planning  Goal: Discharge to home or other facility with appropriate resources  1/22/2023 0510 by Kimberley Ordaz RN  Outcome: Progressing     Problem: Respiratory - Adult  Goal: Achieves optimal ventilation and oxygenation  1/22/2023 0510 by Kimberley Ordaz RN  Outcome: Progressing     Problem: Cardiovascular - Adult  Goal: Maintains optimal cardiac output and hemodynamic stability  1/22/2023 0510 by Kimberley Ordaz RN  Outcome: Progressing     Problem: Skin/Tissue Integrity - Adult  Goal: Skin integrity remains intact  1/22/2023 0510 by Kimberley Ordaz RN  Outcome: Progressing     Problem: Skin/Tissue Integrity - Adult  Goal: Oral mucous membranes remain intact  1/22/2023 0510 by Kimberley Ordaz RN  Outcome: Progressing     Problem: Gastrointestinal - Adult  Goal: Minimal or absence of nausea and vomiting  1/22/2023 0510 by Kimberley Ordaz RN  Outcome: Progressing     Problem: Gastrointestinal - Adult  Goal: Maintains or returns to baseline bowel function  1/22/2023 0510 by Kimberley Ordaz RN  Outcome: Progressing     Problem: Gastrointestinal - Adult  Goal: Maintains adequate nutritional intake  1/22/2023 0510 by Kimberley Ordaz RN  Outcome: Progressing     Problem: Genitourinary - Adult  Goal: Absence of urinary retention  1/22/2023 0510 by Kimberley Ordaz RN  Outcome: Progressing     Problem: Infection - Adult  Goal: Absence of infection at discharge  1/22/2023 0510 by Kimberley Ordaz RN  Outcome: Progressing     Problem: Infection - Adult  Goal: Absence of infection during hospitalization  1/22/2023 0510 by Kimberley Ordaz RN  Outcome: Progressing     Problem: Infection - Adult  Goal: Absence of fever/infection during anticipated neutropenic period  1/22/2023 0510 by Estela Castillo RN  Outcome: Progressing     Problem: Metabolic/Fluid and Electrolytes - Adult  Goal: Electrolytes maintained within normal limits  1/22/2023 0510 by Estela Castillo RN  Outcome: Progressing     Problem: Metabolic/Fluid and Electrolytes - Adult  Goal: Hemodynamic stability and optimal renal function maintained  1/22/2023 0510 by Estela Castillo RN  Outcome: Progressing     Problem: Hematologic - Adult  Goal: Maintains hematologic stability  1/22/2023 0510 by Estela Castillo RN  Outcome: Progressing

## 2023-01-22 NOTE — PLAN OF CARE
Meclizine & Zofran given @ 8991. Pt refused breakfast and lunch but was able to sip on chicken broth for dinner. Changed diet to clear liquid.    Problem: Discharge Planning  Goal: Discharge to home or other facility with appropriate resources  1/22/2023 1851 by Brayden Chacon RN  Outcome: Progressing     Problem: Respiratory - Adult  Goal: Achieves optimal ventilation and oxygenation  1/22/2023 1851 by Brayden Chacon RN  Outcome: Progressing     Problem: Cardiovascular - Adult  Goal: Maintains optimal cardiac output and hemodynamic stability  1/22/2023 1851 by Brayden Chacon RN  Outcome: Progressing     Problem: Skin/Tissue Integrity - Adult  Goal: Skin integrity remains intact  1/22/2023 1851 by Brayden Chacon RN  Outcome: Progressing     Problem: Skin/Tissue Integrity - Adult  Goal: Oral mucous membranes remain intact  1/22/2023 1851 by Brayden Chacon RN  Outcome: Progressing     Problem: Gastrointestinal - Adult  Goal: Minimal or absence of nausea and vomiting  1/22/2023 1851 by Brayden Chacon RN  Outcome: Progressing     Problem: Gastrointestinal - Adult  Goal: Maintains or returns to baseline bowel function  1/22/2023 1851 by Brayden Chacon RN  Outcome: Progressing     Problem: Gastrointestinal - Adult  Goal: Maintains adequate nutritional intake  1/22/2023 1851 by Brayden Chacon RN  Outcome: Progressing     Problem: Genitourinary - Adult  Goal: Absence of urinary retention  1/22/2023 1851 by Brayden Chacon RN  Outcome: Progressing     Problem: Infection - Adult  Goal: Absence of infection at discharge  1/22/2023 1851 by Brayden Chacon RN  Outcome: Progressing     Problem: Infection - Adult  Goal: Absence of infection during hospitalization  1/22/2023 1851 by Brayden Chacon RN  Outcome: Progressing     Problem: Infection - Adult  Goal: Absence of fever/infection during anticipated neutropenic period  1/22/2023 1851 by Tod Thompson RN  Outcome: Progressing Problem: Metabolic/Fluid and Electrolytes - Adult  Goal: Electrolytes maintained within normal limits  1/22/2023 1851 by Dana Rachel RN  Outcome: Progressing     Problem: Metabolic/Fluid and Electrolytes - Adult  Goal: Hemodynamic stability and optimal renal function maintained  1/22/2023 1851 by Dana Rachel RN  Outcome: Progressing     Problem: Hematologic - Adult  Goal: Maintains hematologic stability  1/22/2023 1851 by Dana Rachel RN  Outcome: Progressing

## 2023-01-22 NOTE — PROGRESS NOTES
NIH 0     [facility],[address] [place], [address] [place] [address]  [phone]'    Call to pharmacy telmisartan is interchangable with losartan    VSS; family at bedside    NURSE SWALLOW SCREEN RESULTS    3 oz Water Swallow Screen: Pass    Additonal screening results:     Is the patient on a modified diet (thickened liquids) due to pre-existing dysphagia?: No  Is there presence of existing enteral tube feeding via the stomach or nose?: No  Is there presence of head-of-bed restrictions (less than 30 degrees)?: No  Is there presence of tracheotomy tube?: No  Is the patient on a modified diet (thickened liquids) due to pre-existing dysphagia?: No    No additional actions taken due to Pass result. Speech therapy consult order verified. Patient may have regular texture diet as ordered by provider. NIH Stroke Scale  Interval: Hand-off/Transfer  Level of Consciousness (1a): Alert  LOC Questions (1b): Answers both correctly  LOC Commands (1c): Performs both tasks correctly  Best Gaze (2): Normal  Visual (3): No visual loss  Facial Palsy (4): Normal symmetrical movement  Motor Arm, Left (5a): No drift  Motor Arm, Right (5b): No drift  Motor Leg, Left (6a): No drift  Motor Leg, Right (6b):  No drift  Limb Ataxia (7): Absent  Sensory (8): Normal  Best Language (9): No aphasia  Dysarthria (10): Normal  Extinction and Inattention (11): No abnormality  Total: 0

## 2023-01-22 NOTE — PROGRESS NOTES
Occupational Therapy/ Physical Therapy  [facility]    PT and OT orders received. Admitted for vertigo and dizziness. Attempted to see this AM but patient unable to even open eyes without being severely dizzy and nauseous. Wife in room. Patient did state that symptoms were better over the night but are dizziness resumed again. Patient stated can roll in bed and move in bed with EYES CLOSED. If opens eyes then dizziness and vertigo are severe. Unable to complete PT and OT evaluations this AM. MRI pending. Thank you,  Tonny Carrera.  [facility] OTR/L [address] [facility], DPT PT [id number]

## 2023-01-22 NOTE — PROGRESS NOTES
Hospitalist Progress Note      PCP: No primary care provider on file. Date of Admission: 1/21/2023    Chief Complaint: Dizziness      Subjective:   Dizziness with improvement overnight, but worsened in am.   Denies new acute complaints. Cr trending down    Medications:  Reviewed    Infusion Medications    lactated ringers IV soln 100 mL/hr at 01/22/23 0203     Scheduled Medications    amLODIPine  10 mg Oral Daily    enoxaparin  40 mg SubCUTAneous Daily    aspirin  81 mg Oral Daily    Or    aspirin  300 mg Rectal Daily    atorvastatin  80 mg Oral Nightly     PRN Meds: ondansetron **OR** ondansetron, polyethylene glycol, perflutren lipid microspheres, meclizine      Intake/Output Summary (Last 24 hours) at 1/22/2023 0940  Last data filed at 1/22/2023 0851  Gross per 24 hour   Intake 910.91 ml   Output 500 ml   Net 410.91 ml       Exam:    BP (!) 143/89   Pulse 59   Temp 97.9 °F (36.6 °C) (Oral)   Resp 16   Ht 5' 6\" (1.676 m)   Wt 190 lb (86.2 kg)   SpO2 94%   BMI 30.67 kg/m²     Gen/overall appearance: Not in acute distress. Alert. Head: Normocephalic, atraumatic  Eyes: EOMI, no scleral icterus  CVS: regular rate and rhythm, Normal S1S2  Pulm: Clear to auscultation bilaterally. No crackles/wheezes  Extremities: No edema.  No erythema or warmth  Neuro: No gross focal deficits noted  Skin: Warm, dry    Labs:   Recent Labs     01/21/23  1101 01/22/23  0625   WBC 12.9* 12.8*   HGB 14.7 12.8*   HCT 44.0 37.3*    311     Recent Labs     01/21/23  1101 01/21/23  1210 01/22/23  0625    134* 139   K 3.7 4.1 3.9    100 105   CO2 21 22 23   BUN 22* 22* 17   CREATININE 1.4* 1.5* 1.3   CALCIUM 9.6 9.0 9.0     Recent Labs     01/21/23  1101 01/21/23  1210   AST 25 16   ALT 26 21   BILITOT 0.5 0.4   ALKPHOS 103 97     Recent Labs     01/21/23  1210   INR 1.01     Recent Labs     01/21/23  1101   TROPONINI <0.01       Assessment/Plan:    Active Hospital Problems    Diagnosis Date Noted    Dizziness [R42] 01/21/2023     Priority: Medium          Dizziness. Suspect vertigo. Rule out posterior circulation CVA  L vertebral artery occlusion with collaterals  - CT head without hemorrhage  - CT-A remarkable for occluded L vertebral artery with collaterals  - MRI brain  - ECHO  - ASA and statin  - tele  - orthostatics  - neuro checks  - meclizine and zofran prn  - consider neuro eval     HANK vs CKD. States he has CKD, but unclear baseline. Suspect prerenal azotemia  - IVF hydration  - trend Cr  - monitor and replace lytes  - ins/outs  - avoid nephrotoxins     Leukocytosis. Suspect reactive  - CXR non-acute  - UA clear  - trend CBC     DVT Prophylaxis: lovenox  Diet: ADULT DIET;  Regular; Low Fat/Low Chol/High Fiber/DONNIE  Code Status: Full Code    Heavenly Mitchell MD

## 2023-01-23 ENCOUNTER — APPOINTMENT (OUTPATIENT)
Dept: MRI IMAGING | Age: 58
DRG: 149 | End: 2023-01-23
Payer: COMMERCIAL

## 2023-01-23 VITALS
TEMPERATURE: 98.2 F | OXYGEN SATURATION: 95 % | DIASTOLIC BLOOD PRESSURE: 83 MMHG | HEART RATE: 72 BPM | WEIGHT: 190 LBS | RESPIRATION RATE: 18 BRPM | SYSTOLIC BLOOD PRESSURE: 133 MMHG | HEIGHT: 66 IN | BODY MASS INDEX: 30.53 KG/M2

## 2023-01-23 LAB
ANION GAP SERPL CALCULATED.3IONS-SCNC: 10 MMOL/L (ref 3–16)
BUN BLDV-MCNC: 16 MG/DL (ref 7–20)
CALCIUM SERPL-MCNC: 9.3 MG/DL (ref 8.3–10.6)
CHLORIDE BLD-SCNC: 105 MMOL/L (ref 99–110)
CO2: 25 MMOL/L (ref 21–32)
CREAT SERPL-MCNC: 1.5 MG/DL (ref 0.9–1.3)
ESTIMATED AVERAGE GLUCOSE: 96.8 MG/DL
GFR SERPL CREATININE-BSD FRML MDRD: 54 ML/MIN/{1.73_M2}
GLUCOSE BLD-MCNC: 101 MG/DL (ref 70–99)
HBA1C MFR BLD: 5 %
HCT VFR BLD CALC: 39.2 % (ref 40.5–52.5)
HEMOGLOBIN: 13.6 G/DL (ref 13.5–17.5)
LV EF: 65 %
LVEF MODALITY: NORMAL
MCH RBC QN AUTO: 29.9 PG (ref 26–34)
MCHC RBC AUTO-ENTMCNC: 34.7 G/DL (ref 31–36)
MCV RBC AUTO: 86.1 FL (ref 80–100)
PDW BLD-RTO: 13.7 % (ref 12.4–15.4)
PLATELET # BLD: 305 K/UL (ref 135–450)
PMV BLD AUTO: 8 FL (ref 5–10.5)
POTASSIUM REFLEX MAGNESIUM: 3.9 MMOL/L (ref 3.5–5.1)
RBC # BLD: 4.55 M/UL (ref 4.2–5.9)
SODIUM BLD-SCNC: 140 MMOL/L (ref 136–145)
WBC # BLD: 9.7 K/UL (ref 4–11)

## 2023-01-23 PROCEDURE — 99222 1ST HOSP IP/OBS MODERATE 55: CPT | Performed by: PSYCHIATRY & NEUROLOGY

## 2023-01-23 PROCEDURE — 97166 OT EVAL MOD COMPLEX 45 MIN: CPT

## 2023-01-23 PROCEDURE — 97162 PT EVAL MOD COMPLEX 30 MIN: CPT

## 2023-01-23 PROCEDURE — 85027 COMPLETE CBC AUTOMATED: CPT

## 2023-01-23 PROCEDURE — 6370000000 HC RX 637 (ALT 250 FOR IP): Performed by: INTERNAL MEDICINE

## 2023-01-23 PROCEDURE — 97112 NEUROMUSCULAR REEDUCATION: CPT

## 2023-01-23 PROCEDURE — 97530 THERAPEUTIC ACTIVITIES: CPT

## 2023-01-23 PROCEDURE — 36415 COLL VENOUS BLD VENIPUNCTURE: CPT

## 2023-01-23 PROCEDURE — 6360000002 HC RX W HCPCS: Performed by: INTERNAL MEDICINE

## 2023-01-23 PROCEDURE — 80048 BASIC METABOLIC PNL TOTAL CA: CPT

## 2023-01-23 PROCEDURE — 93306 TTE W/DOPPLER COMPLETE: CPT

## 2023-01-23 PROCEDURE — 70551 MRI BRAIN STEM W/O DYE: CPT

## 2023-01-23 RX ORDER — ONDANSETRON 4 MG/1
4 TABLET, FILM COATED ORAL 3 TIMES DAILY PRN
Qty: 30 TABLET | Refills: 0 | Status: SHIPPED | OUTPATIENT
Start: 2023-01-23

## 2023-01-23 RX ORDER — ATORVASTATIN CALCIUM 80 MG/1
80 TABLET, FILM COATED ORAL NIGHTLY
Qty: 30 TABLET | Refills: 0 | Status: SHIPPED | OUTPATIENT
Start: 2023-01-23

## 2023-01-23 RX ORDER — ASPIRIN 81 MG/1
81 TABLET ORAL DAILY
Qty: 30 TABLET | Refills: 0 | Status: SHIPPED | OUTPATIENT
Start: 2023-01-24

## 2023-01-23 RX ORDER — MECLIZINE HYDROCHLORIDE 25 MG/1
25 TABLET ORAL 3 TIMES DAILY PRN
Qty: 30 TABLET | Refills: 0 | Status: SHIPPED | OUTPATIENT
Start: 2023-01-23 | End: 2023-02-02

## 2023-01-23 RX ADMIN — AMLODIPINE BESYLATE 10 MG: 5 TABLET ORAL at 09:53

## 2023-01-23 RX ADMIN — ENOXAPARIN SODIUM 40 MG: 100 INJECTION SUBCUTANEOUS at 09:53

## 2023-01-23 RX ADMIN — ONDANSETRON 4 MG: 2 INJECTION INTRAMUSCULAR; INTRAVENOUS at 17:34

## 2023-01-23 RX ADMIN — ASPIRIN 81 MG: 81 TABLET, COATED ORAL at 09:53

## 2023-01-23 RX ADMIN — ONDANSETRON 4 MG: 2 INJECTION INTRAMUSCULAR; INTRAVENOUS at 07:29

## 2023-01-23 RX ADMIN — MECLIZINE 25 MG: 12.5 TABLET ORAL at 17:34

## 2023-01-23 RX ADMIN — MECLIZINE 25 MG: 12.5 TABLET ORAL at 07:29

## 2023-01-23 NOTE — PROGRESS NOTES
[facility] Department   Phone: [phone]    Physical Therapy    [x] Initial Evaluation            [] Daily Treatment Note         [] Discharge Summary      Patient: Shivam Cote   : 1965   MRN: [id number]   Date of Service:  2023  Admitting Diagnosis: Dizziness  Current Admission Summary: Shivam Cote is a 65 y.o. male  who presents to the ED after acute spinning and nausea started today. Patient was just driving when this happened all of a sudden felt like his vision was double like everything was spinning, he had no headache no numbness, tingling or weakness in the arms or legs. Past Medical History:  has a past medical history of Hypertension. Past Surgical History:  has no past surgical history on file. Discharge Recommendations: Shivam Cote scored a 17/24 on the AM-PAC short mobility form. Current research shows that an AM-PAC score of 17 or less is typically not associated with a discharge to the patient's home setting. Based on the patient's AM-PAC score and their current functional mobility deficits, it is recommended that the patient have 5-7 sessions per week of Physical Therapy at d/c to increase the patient's independence. At this time, this patient demonstrates complex nursing, medical, and rehabilitative needs, and would benefit from intensive rehabilitation services upon discharge from the Inpatient setting. This patient demonstrates the ability to participate in and benefit from an intensive therapy program with a coordinated interdisciplinary team approach to foster frequent, structured, and documented communication among disciplines, who will work together to establish, prioritize, and achieve treatment goals. Please see assessment section for further patient specific details.     Should pt refuse therapy recommendations and d/c home, recommend pt follow up with OP vestibular therapy services to address balance and vestibular deficits. If patient discharges prior to next session this note will serve as a discharge summary. Please see below for the latest assessment towards goals. DME Required For Discharge: rolling walker  Precautions/Restrictions: medium fall risk  Weight Bearing Restrictions: no restrictions  [] Right Upper Extremity  [] Left Upper Extremity [] Right Lower Extremity  [] Left Lower Extremity     Required Braces/Orthotics: no braces required   [] Right  [] Left  Positional Restrictions:no positional restrictions    Pre-Admission Information   Lives With: spouse    Type of Home: house  Home Layout: two level, bedroom/bathroom upstairs, able to live on main level, full bathroom on main level if needed  Home Access:  2 step to enter without rails   Bathroom Layout:  tub/shower upstairs; walk-in shower on main level  Bathroom Equipment:  none  Toilet Height: elevated height  Home Equipment: single point cane  Transfer Assistance: Independent without use of device  Ambulation Assistance:Independent without use of device  ADL Assistance: independent with all ADL's  IADL Assistance: independent with homemaking tasks  Active :        [x] Yes  [] No  Hand Dominance: [] Left  [x] Right  Current Employment: full time employment. Occupation:   Hobbies:   Recent Falls: Pt repots no recent falls. Examination   Vision:   Vision Gross Assessment: WFL  Hearing:   WFL  Observation:   General Observation:  pt noted to be keeping eyes closed throughout most of session  Posture:   Good   Sensation:   denies numbness and tingling  Tone:   Normotonic  Coordination Testing:   WFL    ROM:   (B) LE AROM WFL  Strength:   B hips grossly 3+/5; B knees grossly 4/5; B ankles grossly 3/5  Therapist Clinical Decision Making (Complexity): medium complexity  Clinical Presentation: evolving      Subjective  General: Pt supine in bed upon arrival, eyes closed. Wife present throughout session.  Pt agreeable to PT/OT eval. Pt states he does not experience as much dizziness/room spinning when his eyes are closed. Pt reports symptoms have improved slightly over the last 24 hours with meclizine and keeping eyes partially closed during mobility. Pain: 0/10  Pain Interventions: not applicable       Functional Mobility  Bed Mobility  Supine to Sit: stand by assistance  Sit to Supine: stand by assistance  Scooting: stand by assistance  Comments: HOB raised for sup>sit  Transfers  Sit to stand transfer: contact guard assistance  Stand to sit transfer: contact guard assistance  Comments:  Ambulation  Surface:level surface  Assistive Device: no device  Assistance: minimal assistance  Distance: 5'+15'  Gait Mechanics: wide Jos, decreased radu, increased medial-lateral sway, decreased B step lengths/heights  Comments:  Pt required increased time to perform, no LOB despite sway. Pt intermittently reaching out for environmental supports and keeping eyes partially closed. Ambulation Trial 2  Surface:level surface  Assistive Device: rolling walker  Assistance: contact guard assistance  Distance: 15'  Gait Mechanics: slightly decreased Jos, maintained decreased radu, less sway, decreased B step lengths/heights  Comments: Pt continues to require increased time to perform, no LOB. VC/TC for walker management. Pt continues to present with eyes partially closed during mobility. Stair Mobility  Stair mobility not completed on this date. Comments:  Wheelchair Mobility:  No w/c mobility completed on this date.   Comments:  Balance  Static Sitting Balance: fair (+): maintains balance at SBA/supervision without use of UE support  Dynamic Sitting Balance: fair (+): maintains balance at SBA/supervision without use of UE support  Static Standing Balance: fair: maintains balance at CGA without use of UE support  Dynamic Standing Balance: poor (+): requires min (A) to maintain balance  Comments:    Other Therapeutic Interventions  Vestibular Screen Cervical AROM : WFL  Scanning/Tracking: Resting horizontal L beating nystagmus noted at rest when pt focusing on object. Horizontal L beating nystagmus noted with tracking towards L along horizontal plane and LUQ/LLQ, pt reporting increased symptoms when tracking towards L and decreased symptoms when tracking towards R  VOR: Slight L beating nystagmus noted when pt turning head towards L; pt reporting increased symptoms towards L. Convergence: Normal  [place] Hallpike Assessment: Pt reporting slight symptoms on R side however no nystagmus noted and pt reporting decrease in symptoms after ~15-20 seconds. Pt reporting symptoms on L side with significant L beating nystagmus noted and no decrease in symptoms after ~60 seconds on L side. Pt's wife asking about BPPV diagnosis (states this was brought up in ED) and pt/wife educated on vestibular testing/BPPV. Vestibular HEP provided at end of session with scanning/tracking habituation exercises. Functional Outcomes  AM-PAC Inpatient Mobility Raw Score : 17              Cognition  WFL  Orientation:    alert and oriented x 4  Command Following:   [facility]    Education  Barriers To Learning: none  Patient Education: patient educated on goals, PT role and benefits, plan of care, HEP, general safety, functional mobility training, proper use of assistive device/equipment, family education, transfer training, discharge recommendations, vestibular education  Learning Assessment:  patient verbalizes and demonstrates understanding, wife verbalizing understanding    Assessment  Activity Tolerance: Limited by dizziness with mobility  Impairments Requiring Therapeutic Intervention: decreased functional mobility, decreased strength, decreased balance, vestibular impairment  Prognosis: good  Clinical Assessment: Pt presents with impaired functional strength and decreased standing balance impairing his ability to perform functional mobility safely and independently.  Pt with PLOF of independence and currently requires Min A for mobility with AD and CGA for mobility with RW. However pt with significantly limited mobility this date due to dizziness and keeping eyes partially closed with all mobility tasks. Pt does present with L beating nystagmus with exacerbation of symptoms when scanning/turning head towards the L and positive [place]-Hallpike on L side. Pt would benefit from acute PT services to address deficits and facilitate return to PLOF.     Safety Interventions: patient left in bed, bed alarm in place, call light within reach, gait belt, patient at risk for falls, nurse notified, and family/caregiver present    Plan  Frequency: 5-7 x/week  Current Treatment Recommendations: strengthening, balance training, functional mobility training, transfer training, gait training, stair training, endurance training, neuromuscular re-education, modalities, patient/caregiver education, home exercise program, safety education, equipment evaluation/education, and vestibular rehab    Goals  Patient Goals: pt did not state   Short Term Goals:  Time Frame: upon d/c  Short term goal 1: Pt will perform bed mobility MOD I   Short term goal 2: Pt will perform transfers with LRAD and supervision  Short term goal 3: Pt will ambulate 48' with LRAD and supervision  Short term goal 4: Pt will negotiate 2 steps without HR and supervision  Short term goal 5: Pt will negotiate 12 steps with HR and supervision     Therapy Session Time      Individual Group Co-treatment   Time In     0859   Time Out     0954   Minutes     55     Timed Code Treatment Minutes:   40  Total Treatment Minutes:  55       Electronically Signed By: Ryan Vazquezr, [id number] [facility],[address], [address] [place] [address]

## 2023-01-23 NOTE — CARE COORDINATION
Discharge Planning Assessment  Discharge Planning Assessment  RN/SW discharge planner met with patient/ (and family member) to discuss reason for admission, current living situation, and potential needs at the time of discharge    Demographics/Insurance verified Yes - CM updated demographics as city and zip code were incorrect. Pt is from near [facility]     Current type of dwellin story home with everything pt needs on the main level     Patient from ECF/SW confirmed with:n/a     Living arrangements:Lives with spouse who is at bedside     Level of function/Support:Independent, active , has family support     PCP:Corby Buck [phone]- Cm was unable to update on demographics but this PCP is listed when going under MD's. Last Visit to PCP:pt unsure at this time     DME:none     Active with any community resources/agencies/skilled home care:none pt too functional for ARU. CM offered to call PCP office to see what hc agency they would recommend in his area and pt declines hc. Pt verbalized understanding that if he gets home and finds he could use home care to call PCP office and they can set up services for him. Medication compliance issues:Independent     Financial issues that could impact healthcare:  None       Tentative discharge plan:home with family     Discussed and provided facilities of choice if transition to a skilled nursing facility is required at the time of discharge  N/A      Discussed with patient and/or family that on the day of discharge home tentative time of discharge will be between 10 AM and noon. Transportation at the time of discharge: spouse in private vehicle.      Benny Pratt RN, BSN  [phone]

## 2023-01-23 NOTE — DISCHARGE SUMMARY
Hospital Medicine Discharge Summary    Patient ID: Michoacano Henriquez      Patient's PCP: No primary care provider on file. Admit Date: 1/21/2023     Discharge Date: 1/23/2023    Admitting Physician: Raymond Holder MD     Discharge Physician: Sallye Eisenmenger, MD     Discharge Diagnoses and Hospital Course: Active Hospital Problems    Diagnosis Date Noted    Dizziness [R42] 01/21/2023     Priority: Medium       Dizziness. Suspect vertigo. Improving  L vertebral artery occlusion with collaterals  Chronic L cerebellar lacunar CVA  - CT head without hemorrhage  - CT-A remarkable for occluded L vertebral artery with collaterals  - MRI brain with chronic small L cerebellar lacunar CVA  - ECHO non-acute  - ASA and statin  - monitor tele  - orthostatics  - neuro checks  - meclizine and zofran prn  - neuro following     Questionable HANK on CKD. States he has CKD, but unclear baseline. Possibly mild prerenal azotemia on admission  - s/p IVF hydration  - trend Cr  - monitor and replace lytes  - ins/outs  - avoid nephrotoxins     Leukocytosis. Suspect reactive. Resolved  - CXR non-acute  - UA clear  - trend CBC      Exam:     The patient was seen and examined on day of discharge and this discharge summary is in conjunction with any daily progress note from day of discharge. Consults:     IP CONSULT TO NEUROLOGY  IP CONSULT TO PHYSICAL MEDICINE REHAB    Disposition:  home; declined ARU and [facility] as from [place]     Condition:  Stable    Discharge Instructions/Follow-up:   Pt to follow up with PCP within 1 week  Consultants as scheduled    Code Status:  Full Code    Activity: activity as tolerated    Diet: cardiac    Labs:  For convenience and continuity at follow-up the following most recent labs are provided:      CBC:    Lab Results   Component Value Date/Time    WBC 9.7 01/23/2023 05:02 AM    HGB 13.6 01/23/2023 05:02 AM    HCT 39.2 01/23/2023 05:02 AM     01/23/2023 05:02 AM       Renal:    Lab Results Component Value Date/Time     01/23/2023 05:02 AM    K 3.9 01/23/2023 05:02 AM     01/23/2023 05:02 AM    CO2 25 01/23/2023 05:02 AM    BUN 16 01/23/2023 05:02 AM    CREATININE 1.5 01/23/2023 05:02 AM    CALCIUM 9.3 01/23/2023 05:02 AM       Discharge Medications:     Current Discharge Medication List             Details   aspirin 81 MG EC tablet Take 1 tablet by mouth daily  Qty: 30 tablet, Refills: 0      meclizine (ANTIVERT) 25 MG tablet Take 1 tablet by mouth 3 times daily as needed for Dizziness  Qty: 30 tablet, Refills: 0      atorvastatin (LIPITOR) 80 MG tablet Take 1 tablet by mouth nightly  Qty: 30 tablet, Refills: 0      ondansetron (ZOFRAN) 4 MG tablet Take 1 tablet by mouth 3 times daily as needed for Nausea or Vomiting  Qty: 30 tablet, Refills: 0                Details   amLODIPine (NORVASC) 10 MG tablet Take 10 mg by mouth daily      allopurinol (ZYLOPRIM) 100 MG tablet Take 100 mg by mouth daily      telmisartan (MICARDIS) 80 MG tablet Take 80 mg by mouth daily             Time Spent on discharge is more than 45 minutes in the examination, evaluation, counseling and review of medications and discharge plan. Signed:    Estuardo Haile MD   1/23/2023    Thank you No primary care provider on file. for the opportunity to be involved in this patient's care.

## 2023-01-23 NOTE — PROGRESS NOTES
[facility] [address]   Phone: [phone]    Occupational Therapy    [x] Initial Evaluation            [] Daily Treatment Note         [] Discharge Summary      Patient: Stephen Strauss   : 1965   MRN: [id number]   Date of Service:  2023    Admitting Diagnosis:  Dizziness  Current Admission Summary: 65 y.o. male with PMH of htn who presents with dizziness. Was driving this morning when he suddenly had abrupt dizziness. Associated with double vision and sensation that room was spinning. Later with nausea and vomiting. Denies dysphasia, dysarthria, HA, focal motor or sensory deficits. Otherwise denies acute complaints and has been feeling at normal health. Past Medical History:  has a past medical history of Hypertension. Past Surgical History:  has no past surgical history on file. Discharge Recommendations: Stephen Strauss scored a 19/24 on the AM-PAC ADL Inpatient form. Current research shows that an AM-PAC score of 17 or less is typically not associated with a discharge to the patient's home setting. Based on the patient's AM-PAC score and their current ADL deficits, it is recommended that the patient have 5-7 sessions per week of Occupational Therapy at d/c to increase the patient's independence. At this time, this patient demonstrates complex nursing, medical, and rehabilitative needs, and would benefit from intensive rehabilitation services upon discharge from the Inpatient setting. This patient demonstrates the ability to participate in and benefit from an intensive therapy program with a coordinated interdisciplinary team approach to foster frequent, structured, and documented communication among disciplines, who will work together to establish, prioritize, and achieve treatment goals. Please see assessment section for further patient specific details.  Although pt scores 19/24, pt is very limited by increased dizziness and would benefit from aggressive inpatient therapy     If patient discharges prior to next session this note will serve as a discharge summary. Please see below for the latest assessment towards goals. If declines [address]: LEVEL 3 SAFETY     - Initial home health evaluation to occur within 24-48 hours, in patient home   - Therapy evaluations in home within 24-48 hours of discharge; including DME and home safety   - Frontload therapy 5 days, then 3x a week   - Therapy to evaluate if patient has [address] needs for personal care   -  evaluation within 24-48 hours, includes evaluation of resources and insurance to determine AL, [place], LTC, and Medicaid options      DME Required For Discharge: DME to be determined at next level of care, rolling walker, shower chair with back, grab bars - shower    Precautions/Restrictions: medium fall risk  Weight Bearing Restrictions: no restrictions  [] Right Upper Extremity  [] Left Upper Extremity [] Right Lower Extremity  [] Left Lower Extremity     Required Braces/Orthotics: no braces required   [] Right  [] Left  Positional Restrictions:no positional restrictions    Pre-Admission Information   Lives With: spouse                  Type of Home: house  Home Layout: two level, bedroom/bathroom upstairs, able to live on main level, full bathroom on main level if needed  Home Access:  2 step to enter without rails   Bathroom Layout:  tub/shower upstairs; walk-in shower on main level  Bathroom Equipment:  none  Toilet Height: elevated height  Home Equipment: single point cane  Transfer Assistance: Independent without use of device  Ambulation Assistance:Independent without use of device  ADL Assistance: independent with all ADL's  IADL Assistance: independent with homemaking tasks  Active :        [x] Yes                 [] No  Hand Dominance: [] Left                 [x] Right  Current Employment: full time employment.   Occupation:   Hobbies:   Recent Falls: Pt repots no recent falls. Examination   Vision:   Vision Gross Assessment: WFL  Hearing:   WFL  Observation:   General Observation:  pt keeps eyes closed throughout most of session d/t dizziness  Posture:   good  Sensation:   denies numbness and tingling  Tone:   Normotonic  Coordination Testing:   WFL    ROM:   (B) UE AROM WFL  Strength:   (B) UE strength grossly WFL    Therapist Clinical Decision Making (Complexity): medium complexity  Clinical Presentation: evolving      Subjective  General: patient seated in bed on arrival, agreeable to OT/PT evaluation. Wife present  Pain: 0/10  Pain Interventions: not applicable        Activities of Daily Living  Basic Activities of Daily Living  General Comments: declines all ADLs, reports wife has been assisting him to restroom  Instrumental Activities of Daily Living  No IADL completed on this date. Functional Mobility  Bed Mobility  Supine to Sit: stand by assistance  Sit to Supine: stand by assistance  Scooting: stand by assistance  Comments: transfers to sitting with HOB elevated and SBA. Returns to supine with HOB flat and SBA  Transfers  Sit to stand transfer:contact guard assistance  Stand to sit transfer: contact guard assistance  Comments: from EOB and recliner with no device CGA- pt keeps eyes closed with initial transfer- requires cues  Functional Mobility:  Standing Balance: contact guard assistance. Functional Mobility: .  contact guard assistance, minimal assistance  Functional Mobility Activity: 5+15+15  Functional Mobility Comment: pt amb 5+15 with no device and Min A, pt keeps eyes down/partially closed, slight lateral sway and pt reaching for external support noted. Pt amb another 15ft with FWW and CGA, improvements in stability noted    Other Therapeutic Interventions  Scanning/Tracking: Resting horizontal L nystagmus noted at rest when pt focusing on object.  Horizontal L nystagmus noted with tracking towards L in upper and lower quadrants, no nystagmus noted with R tracking- pt reporting increased symptoms when tracking towards L and decreased symptoms when tracking towards R  VOR: Slight L nystagmus noted when pt turning head towards L; pt reporting increased symptoms towards L. Convergence: Normal  Darlene Hallpike Assessment: Pt reporting slight symptoms on R side however no nystagmus noted and pt reporting decrease in symptoms after ~15-20 seconds. Pt reporting symptoms on L side with significant L beating nystagmus noted and no decrease in symptoms after ~60 seconds on L side. Pt's wife asking about BPPV diagnosis (states this was brought up in ED) and pt/wife educated on vestibular testing/BPPV. Vestibular HEP provided at end of session with scanning/tracking habituation exercises. Functional Outcomes  AM-PAC Inpatient Daily Activity Raw Score: 19    Cognition  WFL  Orientation:    alert and oriented x 4  Command Following:   [facility]     Education  Barriers To Learning: none  Patient Education: patient educated on OT role and benefits, plan of care, proper use of assistive device/equipment, disease specific education, discharge recommendations  Learning Assessment:  patient verbalizes and demonstrates understanding    Assessment  Activity Tolerance: tolerates well  Impairments Requiring Therapeutic Intervention: decreased functional mobility, decreased ADL status, decreased endurance, decreased balance, decreased IADL, vestibular impairment  Prognosis: good  Clinical Assessment: Pt presents with dizziness s/p BBPV vs r/o CVA. Pt with resting nystagmus as well as nystagmus noted in L horizontal and upper and lower quadrant noted on visual exam. Pt mainly limited by dizziness and decreased standing balance, now currently requiring Min-CGA for functional mobility and ADLs. Pt would benefit from inpatient rehab stay and continued OT services to facilitate return to [facility].    Safety Interventions: patient left in bed, call light within reach, gait belt, nurse notified, and family/caregiver present    Plan  Frequency: 5-7 x/week  Current Treatment Recommendations: strengthening, balance training, functional mobility training, transfer training, endurance training, patient/caregiver education, ADL/self-care training, IADL training, and equipment evaluation/education    Goals  Patient Goals: to return home   Short Term Goals:  Time Frame: discharge  Patient will complete lower body ADL at [facility]   Patient will complete toileting at modified independent   Patient will complete functional transfers at [facility]   Patient will complete functional mobility at [facility]     Therapy Session Time     Individual Group Co-treatment   Time In    0859   Time Out    0955   Minutes    56        Timed Code Treatment Minutes:   41  Total Treatment Minutes:  56       Electronically Signed By: Danette Marquez OTR/HERNAN [id number]

## 2023-01-23 NOTE — PLAN OF CARE
Problem: Discharge Planning  Goal: Discharge to home or other facility with appropriate resources  1/23/2023 0927 by Reji Cordoba RN  Outcome: Progressing     Problem: Respiratory - Adult  Goal: Achieves optimal ventilation and oxygenation  1/23/2023 0927 by Reji Cordoba RN  Outcome: Progressing     Problem: Cardiovascular - Adult  Goal: Maintains optimal cardiac output and hemodynamic stability  1/23/2023 0927 by Reji Cordoba RN  Outcome: Progressing     Problem: Skin/Tissue Integrity - Adult  Goal: Skin integrity remains intact  1/23/2023 0927 by Reji Cordoba RN  Outcome: Progressing     Problem: Skin/Tissue Integrity - Adult  Goal: Oral mucous membranes remain intact  1/23/2023 0927 by Reji Cordoba RN  Outcome: Progressing     Problem: Gastrointestinal - Adult  Goal: Minimal or absence of nausea and vomiting  1/23/2023 0927 by Reji Cordoba RN  Outcome: Progressing     Problem: Gastrointestinal - Adult  Goal: Maintains or returns to baseline bowel function  1/23/2023 0927 by Reji Cordoba RN  Outcome: Progressing     Problem: Gastrointestinal - Adult  Goal: Maintains adequate nutritional intake  1/23/2023 0927 by Reji Cordoba RN  Outcome: Progressing     Problem: Infection - Adult  Goal: Absence of infection at discharge  1/23/2023 0927 by Reji Cordoba RN  Outcome: Progressing     Problem: Genitourinary - Adult  Goal: Absence of urinary retention  1/23/2023 0927 by Reji Cordoba RN  Outcome: Progressing     Problem: Infection - Adult  Goal: Absence of infection during hospitalization  1/23/2023 0927 by Reji Cordoba RN  Outcome: Progressing     Problem: Infection - Adult  Goal: Absence of fever/infection during anticipated neutropenic period  1/23/2023 0927 by Reji Cordoba RN  Outcome: Progressing     Problem: Metabolic/Fluid and Electrolytes - Adult  Goal: Electrolytes maintained within normal limits  1/23/2023 0927 by Reji Cordoba RN  Outcome: Progressing     Problem: Metabolic/Fluid and  Electrolytes - Adult  Goal: Hemodynamic stability and optimal renal function maintained  1/23/2023 0927 by Liban Resendez RN  Outcome: Progressing     Problem: Hematologic - Adult  Goal: Maintains hematologic stability  1/23/2023 0927 by Liban Resendez RN  Outcome: Progressing     Problem: Safety - Adult  Goal: Free from fall injury  1/23/2023 0927 by Liban Resendez RN  Outcome: Progressing     Problem: ABCDS Injury Assessment  Goal: Absence of physical injury  1/23/2023 2701 by Liban Resendez RN  Outcome: Progressing     Problem: Neurosensory - Adult  Goal: Achieves stable or improved neurological status  Outcome: Progressing     Problem: Neurosensory - Adult  Goal: Achieves maximal functionality and self care  Outcome: Progressing

## 2023-01-23 NOTE — PROGRESS NOTES
Hospitalist Progress Note      PCP: No primary care provider on file. Date of Admission: 1/21/2023    Chief Complaint: Dizziness      Subjective: Intermittent dizziness. Overall improving. Denies new acute complaints. Cr 1.5. Medications:  Reviewed    Infusion Medications    lactated ringers IV soln 100 mL/hr at 01/22/23 2224     Scheduled Medications    amLODIPine  10 mg Oral Daily    enoxaparin  40 mg SubCUTAneous Daily    aspirin  81 mg Oral Daily    Or    aspirin  300 mg Rectal Daily    atorvastatin  80 mg Oral Nightly     PRN Meds: ondansetron **OR** ondansetron, polyethylene glycol, perflutren lipid microspheres, meclizine      Intake/Output Summary (Last 24 hours) at 1/23/2023 1329  Last data filed at 1/22/2023 2057  Gross per 24 hour   Intake 1853.97 ml   Output --   Net 1853.97 ml         Exam:    /65   Pulse 70   Temp 98.1 °F (36.7 °C) (Oral)   Resp 19   Ht 5' 6\" (1.676 m)   Wt 190 lb (86.2 kg)   SpO2 95%   BMI 30.67 kg/m²     Gen/overall appearance: Not in acute distress. Alert. Head: Normocephalic, atraumatic  Eyes: EOMI, no scleral icterus  CVS: regular rate and rhythm, Normal S1S2  Pulm: Clear to auscultation bilaterally. No crackles/wheezes  Extremities: No edema.  No erythema or warmth  Neuro: No gross focal deficits noted, 5/5 strength bilat, sensation intact  Skin: Warm, dry    Labs:   Recent Labs     01/21/23  1101 01/22/23  0625 01/23/23  0502   WBC 12.9* 12.8* 9.7   HGB 14.7 12.8* 13.6   HCT 44.0 37.3* 39.2*    311 305       Recent Labs     01/21/23  1210 01/22/23  0625 01/23/23  0502   * 139 140   K 4.1 3.9 3.9    105 105   CO2 22 23 25   BUN 22* 17 16   CREATININE 1.5* 1.3 1.5*   CALCIUM 9.0 9.0 9.3       Recent Labs     01/21/23  1101 01/21/23  1210   AST 25 16   ALT 26 21   BILITOT 0.5 0.4   ALKPHOS 103 97       Recent Labs     01/21/23  1210   INR 1.01       Recent Labs     01/21/23  1101   TROPONINI <0.01         Assessment/Plan:    Active Hospital Problems    Diagnosis Date Noted    Dizziness [R42] 01/21/2023     Priority: Medium          Dizziness. Suspect vertigo. Improving  L vertebral artery occlusion with collaterals  Chronic L cerebellar lacunar CVA  - CT head without hemorrhage  - CT-A remarkable for occluded L vertebral artery with collaterals  - MRI brain with chronic small L cerebellar lacunar CVA  - ECHO  - ASA and statin  - monitor tele  - orthostatics  - neuro checks  - meclizine and zofran prn  - neuro following     Questionable HANK on CKD. States he has CKD, but unclear baseline. Possibly mild prerenal azotemia on admission  - s/p IVF hydration  - trend Cr  - monitor and replace lytes  - ins/outs  - avoid nephrotoxins     Leukocytosis. Suspect reactive. Resolved  - CXR non-acute  - UA clear  - trend CBC     DVT Prophylaxis: lovenox  Diet: ADULT DIET;  Clear Liquid  Code Status: Full Code    Lindy Sahu MD

## 2023-01-23 NOTE — PROGRESS NOTES
CLINICAL PHARMACY NOTE: MEDS TO BEDS    Total # of Prescriptions Filled: 4   The following medications were delivered to the patient:  Aspirin  Atorvastatin  Ondansetron  meclizine    Additional Documentation:  Wife picked up

## 2023-01-23 NOTE — CONSULTS
In patient Neurology consult        [facility]      MD Calin Thomas Monicamichelle  1965    Date of Service: 1/23/2023    Referring Physician: Valerio Hall MD      Reason for the consult and CC: New onset dizziness    HPI:   The patient is a 65y.o.  years old male with history of hypertension who was admitted to the hospital 2 days ago with new onset dizziness and vertigo. Symptoms started on the morning of admission. He was driving when he slowly felt spinning sensation with nausea but no vomiting. No headache or chest pain per Degree was severe persistent. No prior history of dizziness. No recent change in medicine. He came to the ED for evaluation. Initial work-up with CT showed no acute stroke and left vertebral occlusion. He was admitted. Further work-up with MRI showed remote cerebellar stroke. otherwise no acute stroke. Today feels better but not back to his baseline. No headache or chest pain weakness or numbness or tingling. Other review of system was unremarkable. Constitutional:   Vitals:    01/23/23 0430 01/23/23 0728 01/23/23 0945 01/23/23 1120   BP: 137/85 (!) 153/94 (!) 161/92 133/65   Pulse: 74 56  70   Resp: 18 18  19   Temp: 98 °F (36.7 °C) 98 °F (36.7 °C)  98.1 °F (36.7 °C)   TempSrc: Oral Oral  Oral   SpO2: 96% 96%  95%   Weight:       Height:             I personally reviewed and updated social history, past medical history, medications, allergy, surgical history, and family history as documented in the patient's electronic health records. ROS: 10-14 ROS reviewed with the patient/nurse/family which were unremarkable except mentioned in H&P. General appearance:  Normal development and appear in no acute distress. Mental Status:   Oriented to person, place, problem, and time. Memory: Good immediate recall. Intact remote memory  Normal attention span and concentration.   Language: intact naming, repeating and fluency   Good fund of Knowledge. Aware of current events and vocabulary   Cranial Nerves:   II: Visual fields: Full. Pupils: equal, round, reactive to light, bilaterally  III,IV,VI: Extra Ocular Movements are intact. Bilateral end-stage rotational nystagmus   V: Facial sensation is intact  VII: Facial strength and movements: intact and symmetric  IX: Normal palatal elevation and shoulder shrug  XII: Tongue movements are normal  Musculoskeletal: 5/5 in all 4 extremities. Good range of motion. No muscle atrophy. Tone: Normal tone. Reflexes: Symmetric 2+ in the arms and 2+ in the legs   Planters: Flexor bilaterally  Coordination: no pronator drift, no dysmetria with FNF and normal REM  Sensation: normal in both arms and legs. Gait/Posture: Unable to stand due to dizziness. Medical decision making:  Data: reviewed   LABS:   Lab Results   Component Value Date/Time     01/23/2023 05:02 AM    K 3.9 01/23/2023 05:02 AM     01/23/2023 05:02 AM    CO2 25 01/23/2023 05:02 AM    BUN 16 01/23/2023 05:02 AM    CREATININE 1.5 01/23/2023 05:02 AM    LABGLOM 54 01/23/2023 05:02 AM    GLUCOSE 101 01/23/2023 05:02 AM    CALCIUM 9.3 01/23/2023 05:02 AM     Lab Results   Component Value Date/Time    WBC 9.7 01/23/2023 05:02 AM    RBC 4.55 01/23/2023 05:02 AM    HGB 13.6 01/23/2023 05:02 AM    HCT 39.2 01/23/2023 05:02 AM    MCV 86.1 01/23/2023 05:02 AM    RDW 13.7 01/23/2023 05:02 AM     01/23/2023 05:02 AM     Lab Results   Component Value Date    INR 1.01 01/21/2023    PROTIME 13.2 01/21/2023       Neuroimaging was independently reviewed by myself and discussed results with the patient and his wife  Reviewed notes from different physicians including H&P and ED notes. Reviewed lab and blood testing    Impression:  New onset dizziness and vertigo, severe, not controlled. Likely benign paroxysmal positional vertigo. Remote left cerebellar stroke.   Could be thromboembolic from chronic left vertebral occlusion  Hypertension    Recommendation:  Aspirin. He was not taking aspirin at home  Vestibular precaution  PT OT  Meclizine as needed  Statin  Continue home blood pressure medication  Telemetry  DVT and GI prophylaxis  Follow-up with PCP for further stroke prevention imaging echo  Blood pressure monitor closely at home and discussed risk of falling and injuring risk of recurrence  Can be discharged once medically stable  No further commendations      Thank you for referring such patient. If you have any questions regarding my consult note, please don't hesitate to call me. Jimenez Childers MD  [phone]    This dictation was generated by voice recognition computer software.  Although all attempts are made to edit the dictation for accuracy, there may be errors in the  transcription that are not intended

## 2023-01-23 NOTE — PROGRESS NOTES
Data- discharge order received, pt verbalized agreement to discharge, disposition to previous residence, no needs for HHC/DME. Patient declined [address] at this time. Encouraged patient to message PCP if he requires it. Action- discharge instructions prepared and given to patient/wife, pt verbalized understanding. Medication information packet given r/t NEW and/or CHANGED prescriptions emphasizing name/purpose/side effects, pt verbalized understanding. Discharge instruction summary: Diet- general, Activity- as tolerated, Primary Care Physician f/u appointment in 1 week, immunizations reviewed, prescription medications filled at [facility] and picked up by wife prior to discharge. 1. WEIGHT: Admit Weight: 190 lb (86.2 kg) (01/21/23 1048)        Today  Weight: 190 lb (86.2 kg) (01/21/23 1048)       2. O2 SAT.: SpO2: 95 % (01/23/23 1628)    Response- Pt belongings gathered, IV removed. Disposition is home (no HHC/DME needs), transported with wife, taken to lobby via w/c w/ PCA, no complications.

## 2023-01-23 NOTE — PLAN OF CARE
NIH zero; pt denies pain and shortness of breath. Pt stated \"minimal relief\" with PRN zofran & meclizine. Pt stated \"some room spinning\"  when asked to describe relief from medications. Pt remains alert & orientated x4; wife at bedside. No emesis this shift. Pt continues to use call light appropriately. VSS; standard safety precautions in place. Addendum @ 0500: pt stated \"When I stand up it feels like a wave over my entire head from the left to the right. It only lasts a few seconds then it's gone. \"    Problem: Discharge Planning  Goal: Discharge to home or other facility with appropriate resources  1/23/2023 0315 by Hector Negrete RN  Outcome: Progressing     Problem: Respiratory - Adult  Goal: Achieves optimal ventilation and oxygenation  1/23/2023 0315 by Hector Negrete RN  Outcome: Progressing     Problem: Cardiovascular - Adult  Goal: Maintains optimal cardiac output and hemodynamic stability  1/23/2023 0315 by Hector Negrete RN  Outcome: Progressing     Problem: Skin/Tissue Integrity - Adult  Goal: Skin integrity remains intact  1/23/2023 0315 by Hector Negrete RN  Outcome: Progressing     Problem: Skin/Tissue Integrity - Adult  Goal: Oral mucous membranes remain intact  1/23/2023 0315 by Hector Negrete RN  Outcome: Progressing     Problem: Gastrointestinal - Adult  Goal: Minimal or absence of nausea and vomiting  1/23/2023 0315 by Hector Negrete RN  Outcome: Progressing     Problem: Gastrointestinal - Adult  Goal: Maintains or returns to baseline bowel function  1/23/2023 0315 by Hector Negrete RN  Outcome: Progressing     Problem: Gastrointestinal - Adult  Goal: Maintains adequate nutritional intake  1/23/2023 0315 by Hector Negrete RN  Outcome: Progressing     Problem: Genitourinary - Adult  Goal: Absence of urinary retention  1/23/2023 0315 by Hector Negrete RN  Outcome: Progressing     Problem: Infection - Adult  Goal: Absence of infection at discharge  1/23/2023 0315 by Robe Almanza RN  Outcome: Progressing     Problem: Infection - Adult  Goal: Absence of infection during hospitalization  1/23/2023 0315 by Robe Almanza RN  Outcome: Progressing     Problem: Infection - Adult  Goal: Absence of fever/infection during anticipated neutropenic period  1/23/2023 0315 by Robe Almanza RN  Outcome: Progressing     Problem: Metabolic/Fluid and Electrolytes - Adult  Goal: Electrolytes maintained within normal limits  1/23/2023 0315 by Robe Almanza RN  Outcome: Progressing     Problem: Metabolic/Fluid and Electrolytes - Adult  Goal: Hemodynamic stability and optimal renal function maintained  1/23/2023 0315 by Robe Almanza RN  Outcome: Progressing     Problem: Hematologic - Adult  Goal: Maintains hematologic stability  1/23/2023 0315 by Robe Almanza RN  Outcome: Progressing     Problem: Safety - Adult  Goal: Free from fall injury  Outcome: Progressing     Problem: ABCDS Injury Assessment  Goal: Absence of physical injury  Outcome: Progressing